# Patient Record
Sex: MALE | Race: WHITE | NOT HISPANIC OR LATINO | Employment: OTHER | URBAN - METROPOLITAN AREA
[De-identification: names, ages, dates, MRNs, and addresses within clinical notes are randomized per-mention and may not be internally consistent; named-entity substitution may affect disease eponyms.]

---

## 2017-02-20 ENCOUNTER — APPOINTMENT (OUTPATIENT)
Dept: CT IMAGING | Facility: HOSPITAL | Age: 82
End: 2017-02-20

## 2017-02-20 ENCOUNTER — APPOINTMENT (OUTPATIENT)
Dept: GENERAL RADIOLOGY | Facility: HOSPITAL | Age: 82
End: 2017-02-20

## 2017-02-20 ENCOUNTER — HOSPITAL ENCOUNTER (EMERGENCY)
Facility: HOSPITAL | Age: 82
Discharge: HOME | End: 2017-02-20
Attending: EMERGENCY MEDICINE | Admitting: EMERGENCY MEDICINE

## 2017-02-20 VITALS
HEIGHT: 72 IN | TEMPERATURE: 98.4 F | BODY MASS INDEX: 28.04 KG/M2 | DIASTOLIC BLOOD PRESSURE: 87 MMHG | HEART RATE: 84 BPM | RESPIRATION RATE: 16 BRPM | SYSTOLIC BLOOD PRESSURE: 134 MMHG | OXYGEN SATURATION: 99 % | WEIGHT: 207 LBS

## 2017-02-20 DIAGNOSIS — R06.6 SINGULTUS: Primary | ICD-10-CM

## 2017-02-20 LAB
ALBUMIN SERPL-MCNC: 3.2 G/DL (ref 3.5–5.2)
ALBUMIN/GLOB SERPL: 1.1 G/DL
ALP SERPL-CCNC: 64 U/L (ref 40–129)
ALT SERPL W P-5'-P-CCNC: 19 U/L (ref 5–41)
ANION GAP SERPL CALCULATED.3IONS-SCNC: 12.4 MMOL/L
APTT PPP: 27.5 SECONDS (ref 24.3–38.1)
AST SERPL-CCNC: 27 U/L (ref 5–40)
BASOPHILS # BLD AUTO: 0.04 10*3/MM3 (ref 0–0.2)
BASOPHILS NFR BLD AUTO: 0.4 % (ref 0–2)
BILIRUB SERPL-MCNC: 0.9 MG/DL (ref 0.2–1.2)
BUN BLD-MCNC: 14 MG/DL (ref 8–23)
BUN/CREAT SERPL: 17.3 (ref 7–25)
CALCIUM SPEC-SCNC: 8.2 MG/DL (ref 8.8–10.5)
CHLORIDE SERPL-SCNC: 98 MMOL/L (ref 98–107)
CO2 SERPL-SCNC: 24.6 MMOL/L (ref 22–29)
CREAT BLD-MCNC: 0.81 MG/DL (ref 0.76–1.27)
DEPRECATED RDW RBC AUTO: 44.2 FL (ref 37–54)
EOSINOPHIL # BLD AUTO: 0.07 10*3/MM3 (ref 0.1–0.3)
EOSINOPHIL NFR BLD AUTO: 0.7 % (ref 0–4)
ERYTHROCYTE [DISTWIDTH] IN BLOOD BY AUTOMATED COUNT: 13.1 % (ref 11.5–14.5)
GFR SERPL CREATININE-BSD FRML MDRD: 92 ML/MIN/1.73
GLOBULIN UR ELPH-MCNC: 3 GM/DL
GLUCOSE BLD-MCNC: 118 MG/DL (ref 65–99)
HCT VFR BLD AUTO: 28.2 % (ref 42–52)
HGB BLD-MCNC: 9.3 G/DL (ref 14–18)
IMM GRANULOCYTES # BLD: 0.08 10*3/MM3 (ref 0–0.03)
IMM GRANULOCYTES NFR BLD: 0.8 % (ref 0–0.5)
INR PPP: 1 (ref 0.9–1.1)
LYMPHOCYTES # BLD AUTO: 1.59 10*3/MM3 (ref 0.6–4.8)
LYMPHOCYTES NFR BLD AUTO: 16.5 % (ref 20–45)
MCH RBC QN AUTO: 30.6 PG (ref 27–31)
MCHC RBC AUTO-ENTMCNC: 33 G/DL (ref 31–37)
MCV RBC AUTO: 92.8 FL (ref 80–94)
MONOCYTES # BLD AUTO: 1.19 10*3/MM3 (ref 0–1)
MONOCYTES NFR BLD AUTO: 12.4 % (ref 3–8)
NEUTROPHILS # BLD AUTO: 6.66 10*3/MM3 (ref 1.5–8.3)
NEUTROPHILS NFR BLD AUTO: 69.2 % (ref 45–70)
NRBC BLD MANUAL-RTO: 0 /100 WBC (ref 0–0)
NT-PROBNP SERPL-MCNC: 858.5 PG/ML (ref 0–1800)
PLATELET # BLD AUTO: 352 10*3/MM3 (ref 140–500)
PMV BLD AUTO: 9.9 FL (ref 7.4–10.4)
POTASSIUM BLD-SCNC: 4 MMOL/L (ref 3.5–5.2)
PROT SERPL-MCNC: 6.2 G/DL (ref 6–8.5)
PROTHROMBIN TIME: 13.2 SECONDS (ref 12.1–15)
RBC # BLD AUTO: 3.04 10*6/MM3 (ref 4.7–6.1)
SODIUM BLD-SCNC: 135 MMOL/L (ref 136–145)
TROPONIN T SERPL-MCNC: <0.01 NG/ML (ref 0–0.03)
WBC NRBC COR # BLD: 9.63 10*3/MM3 (ref 4.8–10.8)

## 2017-02-20 PROCEDURE — 80053 COMPREHEN METABOLIC PANEL: CPT | Performed by: EMERGENCY MEDICINE

## 2017-02-20 PROCEDURE — 85025 COMPLETE CBC W/AUTO DIFF WBC: CPT | Performed by: EMERGENCY MEDICINE

## 2017-02-20 PROCEDURE — 71275 CT ANGIOGRAPHY CHEST: CPT

## 2017-02-20 PROCEDURE — 0 IOPAMIDOL PER 1 ML: Performed by: EMERGENCY MEDICINE

## 2017-02-20 PROCEDURE — 93005 ELECTROCARDIOGRAM TRACING: CPT | Performed by: EMERGENCY MEDICINE

## 2017-02-20 PROCEDURE — 71020 HC CHEST PA AND LATERAL: CPT

## 2017-02-20 PROCEDURE — 83880 ASSAY OF NATRIURETIC PEPTIDE: CPT | Performed by: EMERGENCY MEDICINE

## 2017-02-20 PROCEDURE — 99284 EMERGENCY DEPT VISIT MOD MDM: CPT | Performed by: EMERGENCY MEDICINE

## 2017-02-20 PROCEDURE — 85730 THROMBOPLASTIN TIME PARTIAL: CPT | Performed by: EMERGENCY MEDICINE

## 2017-02-20 PROCEDURE — 93010 ELECTROCARDIOGRAM REPORT: CPT | Performed by: INTERNAL MEDICINE

## 2017-02-20 PROCEDURE — 84484 ASSAY OF TROPONIN QUANT: CPT | Performed by: EMERGENCY MEDICINE

## 2017-02-20 PROCEDURE — 99285 EMERGENCY DEPT VISIT HI MDM: CPT

## 2017-02-20 PROCEDURE — 85610 PROTHROMBIN TIME: CPT | Performed by: EMERGENCY MEDICINE

## 2017-02-20 RX ORDER — HYDROCODONE BITARTRATE AND ACETAMINOPHEN 7.5; 325 MG/1; MG/1
1 TABLET ORAL EVERY 6 HOURS PRN
COMMUNITY

## 2017-02-20 RX ORDER — FERROUS SULFATE 325(65) MG
325 TABLET ORAL
COMMUNITY

## 2017-02-20 RX ORDER — HYDROCHLOROTHIAZIDE 12.5 MG/1
12.5 TABLET ORAL DAILY
COMMUNITY

## 2017-02-20 RX ORDER — TAMSULOSIN HYDROCHLORIDE 0.4 MG/1
1 CAPSULE ORAL NIGHTLY
COMMUNITY

## 2017-02-20 RX ORDER — SODIUM CHLORIDE 0.9 % (FLUSH) 0.9 %
10 SYRINGE (ML) INJECTION AS NEEDED
Status: DISCONTINUED | OUTPATIENT
Start: 2017-02-20 | End: 2017-02-20 | Stop reason: HOSPADM

## 2017-02-20 RX ADMIN — IOPAMIDOL 100 ML: 755 INJECTION, SOLUTION INTRAVENOUS at 06:38

## 2017-02-20 NOTE — ED NOTES
Pt was awake at midnight and started feeling SOA, Pt had recent right knee replacement. Rt. Knee bandage intact. Rt leg swollen. Good right pedal pulse, foot warm to touch. Family member at bedside     Gail Gonzalez RN  02/20/17 [id number]

## 2017-02-20 NOTE — ED PROVIDER NOTES
Subjective   History of Present Illness  History of Present Illness    Chief complaint: Dyspnea    Location: Lungs    Quality/Severity:  Moderate shortness of breath    Timing/Duration: Began rather suddenly this evening    Modifying Factors: None    Narrative: Patient arrives via EMS for evaluation of new onset shortness of breath tonight.  He had a knee replacement surgery done about 1 week ago at the [facility] and has been recovering at home rather well up until this evening.  He is taking Lovenox shots daily in the abdominal wall at home.  He denies any chest pain or back pain.  He says that he was up late this evening and had just eaten a late night snack around 10 PM when he began to develop worsening hiccups.  Apparently he had been having periodic hiccups ever since his recent surgery in the hospital.  As the hiccups continued through the evening tonight, he became increasingly short of breath.  After the shortness of breath had persisted for over 2 or 3 hours, he became concerned and called 911 for medical assistance.  He has no history of heart or lung disease that he is aware of and does not wear oxygen at home.  He denies any fevers.  He denies any coughing.  He has been ambulatory on the post operative leg at home with the assistance of a walker.    Associated Symptoms: hiccups    Review of Systems   Constitutional: Negative for activity change, appetite change, chills and fever.   HENT: Negative.  Negative for drooling and facial swelling.    Eyes: Negative for pain and visual disturbance.   Respiratory: Positive for shortness of breath. Negative for cough, choking, wheezing and stridor.    Cardiovascular: Negative for chest pain and palpitations.   Gastrointestinal: Negative for abdominal pain and vomiting.   Genitourinary: Negative for dysuria and flank pain.   Musculoskeletal: Positive for arthralgias and myalgias.   Skin: Negative for color change, rash and wound.   Neurological: Negative for  syncope, light-headedness and headaches.   Psychiatric/Behavioral: Negative for agitation and confusion.   All other systems reviewed and are negative.      Past Medical History   Diagnosis Date   • Anxiety    • Hypertension    • Osteopenia        No Known Allergies    Past Surgical History   Procedure Laterality Date   • Total knee arthroplasty         History reviewed. No pertinent family history.    Social History     Social History   • Marital status:      Spouse name: N/A   • Number of children: N/A   • Years of education: N/A     Social History Main Topics   • Smoking status: Never Smoker   • Smokeless tobacco: None   • Alcohol use None      Comment: daily   • Drug use: None   • Sexual activity: Not Asked     Other Topics Concern   • None     Social History Narrative   • None       ED Triage Vitals   Temp Heart Rate Resp BP SpO2   02/20/17 0259 02/20/17 0259 02/20/17 0259 02/20/17 0259 02/20/17 0259   98.4 °F (36.9 °C) 85 22 149/82 98 %      Temp src Heart Rate Source Patient Position BP Location FiO2 (%)   02/20/17 0259 02/20/17 0259 02/20/17 0259 02/20/17 0259 --   Oral Monitor Lying Right arm          Objective   Physical Exam   Constitutional: He is oriented to person, place, and time. He appears well-developed and well-nourished. No distress.   HENT:   Head: Normocephalic and atraumatic.   Eyes: EOM are normal. Pupils are equal, round, and reactive to light. Right eye exhibits no discharge. Left eye exhibits no discharge.   Neck: Normal range of motion. Neck supple.   Cardiovascular: Normal rate, regular rhythm, normal heart sounds and intact distal pulses.  Exam reveals no gallop and no friction rub.    No murmur heard.  Pulmonary/Chest: Effort normal. No respiratory distress. He has no wheezes. He has no rales. He exhibits no tenderness.   Lungs are essentially clear bilaterally.  Normal rate and effort are observed   Abdominal: Soft. There is no tenderness.   Musculoskeletal: He exhibits  tenderness. He exhibits no deformity.   Right knee swelling is noted but surgical wounds appear well healing.  No signs of erythema.  Mild edema noted to both lower legs equally.   Neurological: He is alert and oriented to person, place, and time. No cranial nerve deficit.   Skin: Skin is warm and dry. No rash noted. He is not diaphoretic. No erythema.   Psychiatric: He has a normal mood and affect. His behavior is normal. Judgment and thought content normal.   Nursing note and vitals reviewed.    EKG           EKG time/Interp time: 0301/0301  Rhythm/Rate: nsr, 93 bpm  P waves and LA: P waves are present but with some varying morphology.  LA interval equals 134 ms  QRS, axis: 138 ms, normal axis, right bundle branch block   ST and T waves: No acute ischemic changes evident     Independently interpreted by me contemporaneously with treatment    Results for orders placed or performed during the hospital encounter of 02/20/17   Comprehensive Metabolic Panel   Result Value Ref Range    Glucose 118 (H) 65 - 99 mg/dL    BUN 14 8 - 23 mg/dL    Creatinine 0.81 0.76 - 1.27 mg/dL    Sodium 135 (L) 136 - 145 mmol/L    Potassium 4.0 3.5 - 5.2 mmol/L    Chloride 98 98 - 107 mmol/L    CO2 24.6 22.0 - 29.0 mmol/L    Calcium 8.2 (L) 8.8 - 10.5 mg/dL    Total Protein 6.2 6.0 - 8.5 g/dL    Albumin 3.20 (L) 3.50 - 5.20 g/dL    ALT (SGPT) 19 5 - 41 U/L    AST (SGOT) 27 5 - 40 U/L    Alkaline Phosphatase 64 40 - 129 U/L    Total Bilirubin 0.9 0.2 - 1.2 mg/dL    eGFR Non African Amer 92 >60 mL/min/1.73    Globulin 3.0 gm/dL    A/G Ratio 1.1 g/dL    BUN/Creatinine Ratio 17.3 7.0 - 25.0    Anion Gap 12.4 mmol/L   Protime-INR   Result Value Ref Range    Protime 13.2 12.1 - 15.0 Seconds    INR 1.00 0.90 - 1.10   aPTT   Result Value Ref Range    PTT 27.5 24.3 - 38.1 seconds   BNP   Result Value Ref Range    proBNP 858.5 0.0 - 1800.0 pg/mL   Troponin   Result Value Ref Range    Troponin T <0.010 0.000 - 0.030 ng/mL   CBC Auto Differential    Result Value Ref Range    WBC 9.63 4.80 - 10.80 10*3/mm3    RBC 3.04 (L) 4.70 - 6.10 10*6/mm3    Hemoglobin 9.3 (L) 14.0 - 18.0 g/dL    Hematocrit 28.2 (L) 42.0 - 52.0 %    MCV 92.8 80.0 - 94.0 fL    MCH 30.6 27.0 - 31.0 pg    MCHC 33.0 31.0 - 37.0 g/dL    RDW 13.1 11.5 - 14.5 %    RDW-SD 44.2 37.0 - 54.0 fl    MPV 9.9 7.4 - 10.4 fL    Platelets 352 140 - 500 10*3/mm3    Neutrophil % 69.2 45.0 - 70.0 %    Lymphocyte % 16.5 (L) 20.0 - 45.0 %    Monocyte % 12.4 (H) 3.0 - 8.0 %    Eosinophil % 0.7 0.0 - 4.0 %    Basophil % 0.4 0.0 - 2.0 %    Immature Grans % 0.8 (H) 0.0 - 0.5 %    Neutrophils, Absolute 6.66 1.50 - 8.30 10*3/mm3    Lymphocytes, Absolute 1.59 0.60 - 4.80 10*3/mm3    Monocytes, Absolute 1.19 (H) 0.00 - 1.00 10*3/mm3    Eosinophils, Absolute 0.07 (L) 0.10 - 0.30 10*3/mm3    Basophils, Absolute 0.04 0.00 - 0.20 10*3/mm3    Immature Grans, Absolute 0.08 (H) 0.00 - 0.03 10*3/mm3    nRBC 0.0 0.0 - 0.0 /100 WBC       RADIOLOGY        Study: Chest x-ray    Findings: No acute disease    Interpreted Contemporaneously by myself, independently viewed by me          RADIOLOGY        Study: CTA chest    Findings: No PE.  No acute findings.  Mild hiatal hernia.    Interpreted Contemporaneously by Dr. Snow, independently viewed by me            Procedures         ED Course  ED Course   Comment By Time   Patient has been observed for several hours now and has remained entirely stable with normal oxygenation on room air and no signs respiratory distress all.  He has had one prolonged episodes of hiccups but he seemed to be resolved for the moment.  Awaiting CT result. Baldemar Lewis MD 02/20 0027   I reviewed all labs and the CT report.  Findings are reassuring.  His presentation is really more suggestive that the hiccups are to blame for his dyspnea symptoms last evening.  There really is no concern for pulmonary embolus or any other primary cardiopulmonary disorder at this point.  He has remained entirely stable  throughout this visit and I just went in to wake him up and explained the test results for him right now.  He is resting comfortably with normal oxygen saturations and absolutely no sign of respiratory distress at all.  We'll plan to discharge home in good condition for continued outpatient follow-up and management of his medicines. Baldemar Lewis MD 02/20 0745                  MDM  Number of Diagnoses or Management Options  Kurt:   Diagnosis management comments: My differential diagnosis for dyspnea includes but is not limited to:  Asthma, COPD, pneumonia, pulmonary embolus, acute respiratory distress syndrome, pneumothorax, pleural effusion, pulmonary fibrosis, congestive heart failure, myocardial infarction, DKA, uremia, acidosis, sepsis, anemia, drug related, hyperventilation, CNS disease       Amount and/or Complexity of Data Reviewed  Clinical lab tests: ordered and reviewed  Tests in the radiology section of CPT®: ordered and reviewed  Independent visualization of images, tracings, or specimens: yes    Risk of Complications, Morbidity, and/or Mortality  Presenting problems: high  Diagnostic procedures: moderate  Management options: high        Final diagnoses:   Kurt Lewis MD  02/20/17 0768

## 2017-02-20 NOTE — ED NOTES
Pt cont. To have hiccups. Dr. Lewis aware. Waiting at this time for hiccups to get better than CTA to be done     Gail Gonzalez RN  02/20/17 [id number]

## 2017-02-20 NOTE — DISCHARGE INSTRUCTIONS
Please return to the emergency room for any worsening pain, fevers, coughing, weakness, dizziness, difficulties breathing or any other concerns.

## 2018-11-03 ENCOUNTER — LAB REQUISITION (OUTPATIENT)
Dept: LAB | Facility: HOSPITAL | Age: 83
End: 2018-11-03

## 2018-11-03 DIAGNOSIS — Z00.00 ROUTINE GENERAL MEDICAL EXAMINATION AT A HEALTH CARE FACILITY: ICD-10-CM

## 2018-11-03 LAB
BILIRUB UR QL STRIP: NEGATIVE
CLARITY UR: CLEAR
COLOR UR: YELLOW
GLUCOSE UR STRIP-MCNC: NEGATIVE MG/DL
HGB UR QL STRIP.AUTO: NEGATIVE
KETONES UR QL STRIP: NEGATIVE
LEUKOCYTE ESTERASE UR QL STRIP.AUTO: NEGATIVE
NITRITE UR QL STRIP: NEGATIVE
PH UR STRIP.AUTO: 6.5 [PH] (ref 5–8)
PROT UR QL STRIP: NEGATIVE
SP GR UR STRIP: 1.02 (ref 1–1.03)
UROBILINOGEN UR QL STRIP: NORMAL

## 2018-11-03 PROCEDURE — 81003 URINALYSIS AUTO W/O SCOPE: CPT

## 2025-04-07 NOTE — ED NOTES
Assisted pt to BSC , Voided moderate amt. Of ted color urine. Assisted pt back to bed. Side rails up x2. Call light at side. Pt having hiccups.      Gail Gonzalez RN  02/20/17 [id number]     snet